# Patient Record
Sex: MALE | NOT HISPANIC OR LATINO | URBAN - METROPOLITAN AREA
[De-identification: names, ages, dates, MRNs, and addresses within clinical notes are randomized per-mention and may not be internally consistent; named-entity substitution may affect disease eponyms.]

---

## 2019-09-04 ENCOUNTER — OFFICE VISIT (OUTPATIENT)
Dept: URBAN - METROPOLITAN AREA CLINIC 76 | Facility: CLINIC | Age: 79
End: 2019-09-04
Payer: MEDICARE

## 2019-09-04 DIAGNOSIS — H10.45 OTHER CHRONIC ALLERGIC CONJUNCTIVITIS: ICD-10-CM

## 2019-09-04 PROCEDURE — 99213 OFFICE O/P EST LOW 20 MIN: CPT | Performed by: OPTOMETRIST

## 2019-09-04 RX ORDER — NEOMYCIN SULFATE, POLYMYXIN B SULFATE AND DEXAMETHASONE 3.5; 10000; 1 MG/ML; [USP'U]/ML; MG/ML
SUSPENSION OPHTHALMIC
Qty: 15 | Refills: 1 | Status: INACTIVE
Start: 2019-09-04 | End: 2019-09-18

## 2019-09-04 ASSESSMENT — INTRAOCULAR PRESSURE
OD: 15
OS: 16

## 2019-09-04 NOTE — IMPRESSION/PLAN
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 Plan: Discussed diagnosis in detail with patient. Warm compresses with lid massage from top / down, bottom / up, and sweep from inside / out x 1. Patient instructed to use emulsive base lubricant 3-4 x a day. Increase omega foods/nuts and/or Borage oil supplement 1500-2500mg capsule orally per day. Start Maxitrol TID OU.

## 2019-09-18 ENCOUNTER — OFFICE VISIT (OUTPATIENT)
Dept: URBAN - METROPOLITAN AREA CLINIC 76 | Facility: CLINIC | Age: 79
End: 2019-09-18
Payer: MEDICARE

## 2019-09-18 PROCEDURE — 99213 OFFICE O/P EST LOW 20 MIN: CPT | Performed by: OPTOMETRIST

## 2019-09-18 ASSESSMENT — INTRAOCULAR PRESSURE
OS: 15
OD: 15

## 2019-09-18 NOTE — IMPRESSION/PLAN
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 mild improvement/expression Plan: Rediscussed diagnosis in detail with patient. Continue warm compresses with lid massage from top / down, bottom / up, and sweep from inside / out x 1. Patient instructed to use emulsive base lubricant 3-4 x a day. Recommend preservative free refresh optive advanced, pres free refresh michel 3, and B & L pres free soothe. Increase omega foods/nuts and/or Borage oil supplement 1500-2500mg capsule orally per day.

## 2020-01-07 ENCOUNTER — OFFICE VISIT (OUTPATIENT)
Dept: URBAN - METROPOLITAN AREA CLINIC 76 | Facility: CLINIC | Age: 80
End: 2020-01-07
Payer: MEDICARE

## 2020-01-07 PROCEDURE — 99213 OFFICE O/P EST LOW 20 MIN: CPT | Performed by: OPTOMETRIST

## 2020-01-07 RX ORDER — NEOMYCIN SULFATE, POLYMYXIN B SULFATE AND DEXAMETHASONE 3.5; 10000; 1 MG/ML; [USP'U]/ML; MG/ML
SUSPENSION OPHTHALMIC
Qty: 5 | Refills: 0 | Status: INACTIVE
Start: 2020-01-07 | End: 2020-01-07

## 2020-01-07 ASSESSMENT — INTRAOCULAR PRESSURE
OD: 16
OS: 16

## 2020-01-07 NOTE — IMPRESSION/PLAN
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 Posterior lid disease. Plan: Rediscussed diagnosis in detail with patient. Continue warm compresses with lid massage from top / down, bottom / up, and sweep from inside / out x 1. Patient instructed to use emulsive base lubricant 3-4 x a day-gave sample of Systane Complete. Increase omega foods/nuts and/or Borage oil supplement 1500-2500mg capsule orally per day. Restart Maxitrol BID OU.

## 2020-02-10 ENCOUNTER — OFFICE VISIT (OUTPATIENT)
Dept: URBAN - METROPOLITAN AREA CLINIC 76 | Facility: CLINIC | Age: 80
End: 2020-02-10
Payer: MEDICARE

## 2020-02-10 PROCEDURE — 99213 OFFICE O/P EST LOW 20 MIN: CPT | Performed by: OPTOMETRIST

## 2020-02-10 ASSESSMENT — KERATOMETRY
OS: 43.13
OD: 43.50

## 2020-02-10 ASSESSMENT — INTRAOCULAR PRESSURE
OD: 16
OS: 15

## 2020-02-10 NOTE — IMPRESSION/PLAN
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 Posterior lid disease. Worsened. Plan: Rediscussed diagnosis in detail with patient. Decrease warm compresses with lid massage to once a day OU. Continue Refresh Optive PF QID OU-gave samples. Continue Maxitrol BID OU, rub excess into lids and lashes. Explained this is a chronic problem.

## 2020-02-13 ENCOUNTER — OFFICE VISIT (OUTPATIENT)
Dept: URBAN - METROPOLITAN AREA CLINIC 76 | Facility: CLINIC | Age: 80
End: 2020-02-13
Payer: MEDICARE

## 2020-02-13 DIAGNOSIS — H02.889 MEIBOMIAN GLAND DYSFUNCTION OF EYE: Primary | ICD-10-CM

## 2020-02-13 DIAGNOSIS — H40.043 STEROID RESPONDER, BILATERAL: ICD-10-CM

## 2020-02-13 DIAGNOSIS — H04.123 DRY EYE SYNDROME OF BILATERAL LACRIMAL GLANDS: ICD-10-CM

## 2020-02-13 PROCEDURE — 92012 INTRM OPH EXAM EST PATIENT: CPT | Performed by: OPHTHALMOLOGY

## 2020-02-13 ASSESSMENT — INTRAOCULAR PRESSURE
OD: 45
OS: 44

## 2020-02-13 NOTE — IMPRESSION/PLAN
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 Plan: Dry eyes account for the patient's complaints. There is no evidence of permanent changes to the cornea. Explained condition does not have a cure and will need artificial tears for maintenance. Recommend patient Increase AT's 3-4 x per day OU more if needed, take breaks when doing near work.

## 2020-02-13 NOTE — IMPRESSION/PLAN
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 IOP OU high today, ? steroid response to Maxitrol Plan: Discussed diagnosis in detail with patient. D/C Maxitrol. Continue warm compresses BID w/ massage. Can consider Dry Eye/Bleph-x consult w/ Dr Nicolette Lee if no improvement.

## 2020-02-13 NOTE — IMPRESSION/PLAN
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 IOP Too high. ? steroid response D/C Maxitrol Plan: Discussed diagnosis in detail with patient. Will start Alphagan BID OU.

## 2020-02-14 ENCOUNTER — OFFICE VISIT (OUTPATIENT)
Dept: URBAN - METROPOLITAN AREA CLINIC 199 | Facility: CLINIC | Age: 80
End: 2020-02-14
Payer: MEDICARE

## 2020-02-14 PROCEDURE — 99213 OFFICE O/P EST LOW 20 MIN: CPT | Performed by: OPHTHALMOLOGY

## 2020-02-14 ASSESSMENT — INTRAOCULAR PRESSURE
OD: 23
OS: 23

## 2020-02-14 NOTE — IMPRESSION/PLAN
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 ? steroid response IOP OU improved, but still slightly high Plan: Continue to monitor. Alphagan BID OU.

## 2020-02-14 NOTE — IMPRESSION/PLAN
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 , ? steroid response to Maxitrol Plan: Continue to monitor. Continue warm compresses BID w/ massage. Can consider Dry Eye/Bleph-x consult w/ Dr Wallace Nicely if no improvement.

## 2020-02-21 ENCOUNTER — OFFICE VISIT (OUTPATIENT)
Dept: URBAN - METROPOLITAN AREA CLINIC 76 | Facility: CLINIC | Age: 80
End: 2020-02-21
Payer: MEDICARE

## 2020-02-21 PROCEDURE — 99213 OFFICE O/P EST LOW 20 MIN: CPT | Performed by: OPHTHALMOLOGY

## 2020-02-21 ASSESSMENT — INTRAOCULAR PRESSURE
OD: 12
OS: 13

## 2020-02-21 NOTE — IMPRESSION/PLAN
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 , ? steroid response to Maxitrol Plan: Continue to monitor. Continue warm compresses BID w/ massage. Can consider Dry Eye/Bleph-x consult w/ Dr Han Wyatt, patient would like to proceed w/ consult.  information given to patient

## 2020-02-21 NOTE — IMPRESSION/PLAN
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 improving Plan: Continue to monitor. There is no evidence of permanent changes to the cornea. Explained condition does not have a cure and will need artificial tears for maintenance. Continue AT's 3-4 x per day OU more if needed, take breaks when doing near work.

## 2020-02-21 NOTE — IMPRESSION/PLAN
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 ? steroid response- resolved IOP OU good today. Plan: Continue to monitor.  D/C  Alphagan

## 2020-10-07 ENCOUNTER — OFFICE VISIT (OUTPATIENT)
Dept: URBAN - METROPOLITAN AREA CLINIC 81 | Facility: CLINIC | Age: 80
End: 2020-10-07
Payer: MEDICARE

## 2020-10-07 DIAGNOSIS — H43.813 VITREOUS DEGENERATION, BILATERAL: ICD-10-CM

## 2020-10-07 PROCEDURE — 99212 OFFICE O/P EST SF 10 MIN: CPT | Performed by: OPTOMETRIST

## 2020-10-07 ASSESSMENT — INTRAOCULAR PRESSURE
OS: 17
OD: 17

## 2020-10-07 NOTE — IMPRESSION/PLAN
Impression: Vitreous degeneration, bilateral: H43.813. Plan: Discussed Dx of posterior vitreous detachment. Discussed signs and symptoms of retinal tear/detachment. Pt to RTC PRN with any change to visual status. 

 RTC 1 year/PRN

## 2022-02-17 ENCOUNTER — OFFICE VISIT (OUTPATIENT)
Dept: URBAN - METROPOLITAN AREA CLINIC 76 | Facility: CLINIC | Age: 82
End: 2022-02-17
Payer: MEDICARE

## 2022-02-17 DIAGNOSIS — Z96.1 PRESENCE OF INTRAOCULAR LENS: ICD-10-CM

## 2022-02-17 PROCEDURE — 92012 INTRM OPH EXAM EST PATIENT: CPT | Performed by: OPHTHALMOLOGY

## 2022-02-17 ASSESSMENT — INTRAOCULAR PRESSURE
OD: 15
OS: 15

## 2022-02-17 NOTE — IMPRESSION/PLAN
Impression: Dry eye syndrome of bilateral lacrimal glands: H04.123. Plan: Continue to monitor. There is no evidence of permanent changes to the cornea. Explained condition does not have a cure and will need artificial tears for maintenance. Continue AT's 3-4 x per day OU more if needed, take breaks when doing near work.

## 2022-02-17 NOTE — IMPRESSION/PLAN
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 ? steroid response- resolved IOP good OU today. Plan: Discussed condition. Continue to monitor off treatment. Pt to call with concerns.

## 2022-02-17 NOTE — IMPRESSION/PLAN
Impression: Diagnosis: Presence of intraocular lens. Code: Z96.1. s/p YAG OU. Plan: Continue to monitor.

## 2022-09-20 ENCOUNTER — OFFICE VISIT (OUTPATIENT)
Dept: URBAN - METROPOLITAN AREA CLINIC 76 | Facility: CLINIC | Age: 82
End: 2022-09-20
Payer: MEDICARE

## 2022-09-20 DIAGNOSIS — H40.043 STEROID RESPONDER, BILATERAL: Primary | ICD-10-CM

## 2022-09-20 DIAGNOSIS — Z96.1 PRESENCE OF INTRAOCULAR LENS: ICD-10-CM

## 2022-09-20 DIAGNOSIS — H04.123 DRY EYE SYNDROME OF BILATERAL LACRIMAL GLANDS: ICD-10-CM

## 2022-09-20 PROCEDURE — 92014 COMPRE OPH EXAM EST PT 1/>: CPT | Performed by: OPHTHALMOLOGY

## 2022-09-20 ASSESSMENT — INTRAOCULAR PRESSURE
OD: 14
OS: 14

## 2022-09-20 NOTE — IMPRESSION/PLAN
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 ? steroid response- resolved IOP good OU today. Plan: Discussed condition. Continue to monitor off treatment. Pt to call with concerns. Recommend baseline testing next visit.

## 2023-03-23 ENCOUNTER — OFFICE VISIT (OUTPATIENT)
Dept: URBAN - METROPOLITAN AREA CLINIC 76 | Facility: CLINIC | Age: 83
End: 2023-03-23
Payer: MEDICARE

## 2023-03-23 DIAGNOSIS — H40.043 STEROID RESPONDER, BILATERAL: Primary | ICD-10-CM

## 2023-03-23 DIAGNOSIS — Z96.1 PRESENCE OF INTRAOCULAR LENS: ICD-10-CM

## 2023-03-23 PROCEDURE — 92133 CPTRZD OPH DX IMG PST SGM ON: CPT | Performed by: OPHTHALMOLOGY

## 2023-03-23 PROCEDURE — 92083 EXTENDED VISUAL FIELD XM: CPT | Performed by: OPHTHALMOLOGY

## 2023-03-23 PROCEDURE — 99214 OFFICE O/P EST MOD 30 MIN: CPT | Performed by: OPHTHALMOLOGY

## 2023-03-23 ASSESSMENT — INTRAOCULAR PRESSURE
OS: 18
OD: 19

## 2023-03-23 NOTE — IMPRESSION/PLAN
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 ? steroid response- resolved IOP good OU today. Baseline VF and OCT performed today. VF likely lid defect OU. Plan: Discussed condition. Continue to monitor off treatment. Recommend repeat VF next visit with lids taped. Pt to call with concerns.